# Patient Record
Sex: MALE | Race: WHITE | Employment: FULL TIME | URBAN - METROPOLITAN AREA
[De-identification: names, ages, dates, MRNs, and addresses within clinical notes are randomized per-mention and may not be internally consistent; named-entity substitution may affect disease eponyms.]

---

## 2021-02-13 ENCOUNTER — HOSPITAL ENCOUNTER (EMERGENCY)
Age: 46
Discharge: HOME | End: 2021-02-13
Attending: EMERGENCY MEDICINE
Payer: OTHER MISCELLANEOUS

## 2021-02-13 ENCOUNTER — APPOINTMENT (OUTPATIENT)
Dept: CT IMAGING | Age: 46
End: 2021-02-13
Payer: OTHER MISCELLANEOUS

## 2021-02-13 VITALS
OXYGEN SATURATION: 98 % | TEMPERATURE: 98.7 F | RESPIRATION RATE: 18 BRPM | DIASTOLIC BLOOD PRESSURE: 87 MMHG | WEIGHT: 170 LBS | HEIGHT: 67 IN | BODY MASS INDEX: 26.68 KG/M2 | SYSTOLIC BLOOD PRESSURE: 139 MMHG | HEART RATE: 79 BPM
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
## 2021-02-13 LAB
ABO/RH: NORMAL
ALBUMIN SERPL-MCNC: 4.8 G/DL (ref 3.5–5.2)
ALP BLD-CCNC: 62 U/L (ref 40–129)
ALT SERPL-CCNC: 59 U/L (ref 0–40)
ANION GAP SERPL CALCULATED.3IONS-SCNC: 10 MMOL/L (ref 7–16)
ANTIBODY SCREEN: NORMAL
AST SERPL-CCNC: 39 U/L (ref 0–39)
BASOPHILS ABSOLUTE: 0.07 E9/L (ref 0–0.2)
BASOPHILS RELATIVE PERCENT: 1.3 % (ref 0–2)
BILIRUB SERPL-MCNC: 0.6 MG/DL (ref 0–1.2)
BUN BLDV-MCNC: 13 MG/DL (ref 6–20)
CALCIUM SERPL-MCNC: 9.3 MG/DL (ref 8.6–10.2)
CHLORIDE BLD-SCNC: 98 MMOL/L (ref 98–107)
CO2: 25 MMOL/L (ref 22–29)
CREAT SERPL-MCNC: 0.8 MG/DL (ref 0.7–1.2)
EOSINOPHILS ABSOLUTE: 0.31 E9/L (ref 0.05–0.5)
EOSINOPHILS RELATIVE PERCENT: 5.8 % (ref 0–6)
GFR AFRICAN AMERICAN: >60
GFR NON-AFRICAN AMERICAN: >60 ML/MIN/1.73
GLUCOSE BLD-MCNC: 102 MG/DL (ref 74–99)
HCT VFR BLD CALC: 46.5 % (ref 37–54)
HEMOGLOBIN: 16.4 G/DL (ref 12.5–16.5)
IMMATURE GRANULOCYTES #: 0.01 E9/L
IMMATURE GRANULOCYTES %: 0.2 % (ref 0–5)
INR BLD: 1
LIPASE: 13 U/L (ref 13–60)
LYMPHOCYTES ABSOLUTE: 1.81 E9/L (ref 1.5–4)
LYMPHOCYTES RELATIVE PERCENT: 34 % (ref 20–42)
MCH RBC QN AUTO: 30 PG (ref 26–35)
MCHC RBC AUTO-ENTMCNC: 35.3 % (ref 32–34.5)
MCV RBC AUTO: 85.2 FL (ref 80–99.9)
MONOCYTES ABSOLUTE: 0.68 E9/L (ref 0.1–0.95)
MONOCYTES RELATIVE PERCENT: 12.8 % (ref 2–12)
NEUTROPHILS ABSOLUTE: 2.44 E9/L (ref 1.8–7.3)
NEUTROPHILS RELATIVE PERCENT: 45.9 % (ref 43–80)
PDW BLD-RTO: 11.9 FL (ref 11.5–15)
PLATELET # BLD: 276 E9/L (ref 130–450)
PMV BLD AUTO: 9.8 FL (ref 7–12)
POTASSIUM REFLEX MAGNESIUM: 4.2 MMOL/L (ref 3.5–5)
PROTHROMBIN TIME: 12 SEC (ref 9.3–12.4)
RBC # BLD: 5.46 E12/L (ref 3.8–5.8)
SODIUM BLD-SCNC: 133 MMOL/L (ref 132–146)
TOTAL PROTEIN: 8 G/DL (ref 6.4–8.3)
TROPONIN: <0.01 NG/ML (ref 0–0.03)
WBC # BLD: 5.3 E9/L (ref 4.5–11.5)

## 2021-02-13 PROCEDURE — 99291 CRITICAL CARE FIRST HOUR: CPT

## 2021-02-13 PROCEDURE — 83690 ASSAY OF LIPASE: CPT

## 2021-02-13 PROCEDURE — 93005 ELECTROCARDIOGRAM TRACING: CPT | Performed by: GENERAL PRACTICE

## 2021-02-13 PROCEDURE — 70450 CT HEAD/BRAIN W/O DYE: CPT

## 2021-02-13 PROCEDURE — 85610 PROTHROMBIN TIME: CPT

## 2021-02-13 PROCEDURE — 6830039000 HC L3 TRAUMA ALERT

## 2021-02-13 PROCEDURE — 86900 BLOOD TYPING SEROLOGIC ABO: CPT

## 2021-02-13 PROCEDURE — 80053 COMPREHEN METABOLIC PANEL: CPT

## 2021-02-13 PROCEDURE — 86901 BLOOD TYPING SEROLOGIC RH(D): CPT

## 2021-02-13 PROCEDURE — 99285 EMERGENCY DEPT VISIT HI MDM: CPT | Performed by: SURGERY

## 2021-02-13 PROCEDURE — 6360000004 HC RX CONTRAST MEDICATION: Performed by: RADIOLOGY

## 2021-02-13 PROCEDURE — 85025 COMPLETE CBC W/AUTO DIFF WBC: CPT

## 2021-02-13 PROCEDURE — 71260 CT THORAX DX C+: CPT

## 2021-02-13 PROCEDURE — 86850 RBC ANTIBODY SCREEN: CPT

## 2021-02-13 PROCEDURE — 74177 CT ABD & PELVIS W/CONTRAST: CPT

## 2021-02-13 PROCEDURE — 72125 CT NECK SPINE W/O DYE: CPT

## 2021-02-13 PROCEDURE — G0390 TRAUMA RESPONS W/HOSP CRITI: HCPCS

## 2021-02-13 PROCEDURE — 84484 ASSAY OF TROPONIN QUANT: CPT

## 2021-02-13 RX ORDER — NAPROXEN 375 MG/1
375 TABLET ORAL 2 TIMES DAILY WITH MEALS
Qty: 60 TABLET | Refills: 0 | Status: SHIPPED | OUTPATIENT
Start: 2021-02-13

## 2021-02-13 RX ADMIN — IOPAMIDOL 75 ML: 755 INJECTION, SOLUTION INTRAVENOUS at 17:39

## 2021-02-13 NOTE — ED NOTES
Pt states he doesn't drink or do drugs, he used to smoke but quit in 2012     Carin Mosley, [address] [facility] [place]  02/13/21 7793

## 2021-02-13 NOTE — ED NOTES
Pt log rolled to right side, C-Spine maintained in neutral position  Tenderness to lumbar region upon palpation no step offs no deformities      Chad Albrecht RN  02/13/21 2333

## 2021-02-13 NOTE — ED NOTES
C Collar applied to pt   Breath sounds clear b/l  Pt denies blood thinners  GCS 15  Pt has RLQ pain upon palpation  Equal strong fem pulses  Good pedal pulses  No pelvic pain     Marcia Ford RN  02/13/21 076 [facility]  02/13/21 9547
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
## 2021-02-13 NOTE — ED NOTES
Results of testing explained to patient. Patient verbalizes understanding of instructions regarding testing and need to follow up with PCP and/or specialist provider.         Meryl Sears RN  02/13/21 8179
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
## 2021-02-14 LAB
EKG ATRIAL RATE: 62 BPM
EKG P AXIS: 2 DEGREES
EKG P-R INTERVAL: 138 MS
EKG Q-T INTERVAL: 410 MS
EKG QRS DURATION: 94 MS
EKG QTC CALCULATION (BAZETT): 416 MS
EKG R AXIS: -14 DEGREES
EKG T AXIS: 15 DEGREES
EKG VENTRICULAR RATE: 62 BPM

## 2021-02-14 PROCEDURE — 93010 ELECTROCARDIOGRAM REPORT: CPT | Performed by: INTERNAL MEDICINE

## 2023-08-09 LAB
BASOPHILS (10*3/UL) IN BLOOD BY AUTOMATED COUNT: 0.05 X10E9/L (ref 0–0.1)
BASOPHILS/100 LEUKOCYTES IN BLOOD BY AUTOMATED COUNT: 0.9 % (ref 0–2)
EOSINOPHILS (10*3/UL) IN BLOOD BY AUTOMATED COUNT: 0.23 X10E9/L (ref 0–0.7)
EOSINOPHILS/100 LEUKOCYTES IN BLOOD BY AUTOMATED COUNT: 4.1 % (ref 0–6)
ERYTHROCYTE DISTRIBUTION WIDTH (RATIO) BY AUTOMATED COUNT: 12.5 % (ref 11.5–14.5)
ERYTHROCYTE MEAN CORPUSCULAR HEMOGLOBIN CONCENTRATION (G/DL) BY AUTOMATED: 33.1 G/DL (ref 32–36)
ERYTHROCYTE MEAN CORPUSCULAR VOLUME (FL) BY AUTOMATED COUNT: 92 FL (ref 80–100)
ERYTHROCYTES (10*6/UL) IN BLOOD BY AUTOMATED COUNT: 5.3 X10E12/L (ref 4.5–5.9)
HEMATOCRIT (%) IN BLOOD BY AUTOMATED COUNT: 48.7 % (ref 41–52)
HEMOGLOBIN (G/DL) IN BLOOD: 16.1 G/DL (ref 13.5–17.5)
IMMATURE GRANULOCYTES/100 LEUKOCYTES IN BLOOD BY AUTOMATED COUNT: 0 % (ref 0–0.9)
IRON (UG/DL) IN SER/PLAS: 73 UG/DL (ref 35–150)
IRON BINDING CAPACITY (UG/DL) IN SER/PLAS: 272 UG/DL (ref 240–445)
IRON SATURATION (%) IN SER/PLAS: 27 % (ref 25–45)
LEUKOCYTES (10*3/UL) IN BLOOD BY AUTOMATED COUNT: 5.6 X10E9/L (ref 4.4–11.3)
LYMPHOCYTES (10*3/UL) IN BLOOD BY AUTOMATED COUNT: 1.71 X10E9/L (ref 1.2–4.8)
LYMPHOCYTES/100 LEUKOCYTES IN BLOOD BY AUTOMATED COUNT: 30.4 % (ref 13–44)
MONOCYTES (10*3/UL) IN BLOOD BY AUTOMATED COUNT: 0.61 X10E9/L (ref 0.1–1)
MONOCYTES/100 LEUKOCYTES IN BLOOD BY AUTOMATED COUNT: 10.8 % (ref 2–10)
NEUTROPHILS (10*3/UL) IN BLOOD BY AUTOMATED COUNT: 3.03 X10E9/L (ref 1.2–7.7)
NEUTROPHILS/100 LEUKOCYTES IN BLOOD BY AUTOMATED COUNT: 53.8 % (ref 40–80)
PLATELETS (10*3/UL) IN BLOOD AUTOMATED COUNT: 243 X10E9/L (ref 150–450)

## 2023-08-10 LAB
A10: NORMAL
A117: NORMAL
A11B: NORMAL
A13: NORMAL
A14: NORMAL
A15: NORMAL
A163: NORMAL
A16: NORMAL
A177: NORMAL
A19: NORMAL
A1C: NORMAL
A20: NORMAL
A22: NORMAL
A2: NORMAL
A304: NORMAL
A33: NORMAL
A34: NORMAL
A38: NORMAL
A3: NORMAL
A45: NORMAL
A4: NORMAL
A56: NORMAL
A5: NORMAL
A71: NORMAL
A7: NORMAL
A8: NORMAL
A9: NORMAL
AANTI: NORMAL
ABCP1: NORMAL
AHLAD: NORMAL
AKAPP: NORMAL
ALAMB: NORMAL
BDESC: NORMAL
BPERC: NORMAL %
CCOLL: NORMAL
CCOUN: NORMAL X10E9/L
COBALAMIN (VITAMIN B12) (PG/ML) IN SER/PLAS: 672 PG/ML (ref 211–911)
CP1PH: NORMAL
FANTA: NORMAL
FSITE: NORMAL
GDESC: NORMAL
GPERC: NORMAL %
L10: NORMAL
L11C: NORMAL
L13: NORMAL
L14: NORMAL
L16: NORMAL
L180: NORMAL
L19: NORMAL
L1C: NORMAL
L200: NORMAL
L20: NORMAL
L23: NORMAL
L25: NORMAL
L2: NORMAL
L38: NORMAL
L3: NORMAL
L43: NORMAL
L45: NORMAL
L4: NORMAL
L56: NORMAL
L5: NORMAL
L71: NORMAL
L79B: NORMAL
L7: NORMAL
L8: NORMAL
LCD19: NORMAL % OF LYMPH
LCD4: NORMAL % OF LYMPH
LCD8: NORMAL % OF LYMPH
LDESC: NORMAL
LGPD1: NORMAL
LGPNO: NORMAL
LHLAD: NORMAL
LIGD: NORMAL
LKAPP: NORMAL
LLAMB: NORMAL
LNK: NORMAL % OF LYMPH
LPERC: NORMAL %
Lab: NORMAL
Lab: NORMAL %
MDESC: NORMAL
MPERC: NORMAL %
PDESC: NORMAL
PERC: NORMAL %
PPERC: NORMAL %
RDESC: NORMAL
RPERC: NORMAL %
VIAB: NORMAL

## 2023-08-14 LAB
CANTI: NORMAL
CCOLL: NORMAL PER TUBE
CCOUN: 5.6 X10E9/L
FCTOR: NORMAL
FCTSO: NORMAL
FSITE: NORMAL
GPERC: 54 %
LCD19: 11 % OF LYMPH
LCD4: 51 % OF LYMPH
LCD8: 23 % OF LYMPH
LDESC: NORMAL
LGPD1: NORMAL
LGPNO: NORMAL
LNK: 13 % OF LYMPH
LPERC: 28 %
MPERC: 8 %
PV194: NORMAL
VIAB: NORMAL

## 2023-10-06 ENCOUNTER — HOSPITAL ENCOUNTER (OUTPATIENT)
Dept: RADIOLOGY | Facility: HOSPITAL | Age: 48
Discharge: HOME | End: 2023-10-06
Payer: COMMERCIAL

## 2023-10-06 DIAGNOSIS — D83.9 CVID (COMMON VARIABLE IMMUNODEFICIENCY) (MULTI): ICD-10-CM

## 2023-10-06 PROCEDURE — 70486 CT MAXILLOFACIAL W/O DYE: CPT | Performed by: RADIOLOGY

## 2023-10-06 PROCEDURE — 70486 CT MAXILLOFACIAL W/O DYE: CPT

## 2024-06-13 DIAGNOSIS — D83.9 CVID (COMMON VARIABLE IMMUNODEFICIENCY) (MULTI): Primary | ICD-10-CM

## 2024-07-17 ENCOUNTER — LAB (OUTPATIENT)
Dept: LAB | Facility: LAB | Age: 49
End: 2024-07-17
Payer: COMMERCIAL

## 2024-07-17 DIAGNOSIS — D83.9 CVID (COMMON VARIABLE IMMUNODEFICIENCY) (MULTI): ICD-10-CM

## 2024-07-17 LAB
ALBUMIN SERPL BCP-MCNC: 4.2 G/DL (ref 3.4–5)
ALP SERPL-CCNC: 65 U/L (ref 33–120)
ALT SERPL W P-5'-P-CCNC: 37 U/L (ref 10–52)
ANION GAP SERPL CALC-SCNC: 7 MMOL/L (ref 10–20)
AST SERPL W P-5'-P-CCNC: 28 U/L (ref 9–39)
BASOPHILS # BLD AUTO: 0.04 X10*3/UL (ref 0–0.1)
BASOPHILS NFR BLD AUTO: 1 %
BILIRUB SERPL-MCNC: 0.3 MG/DL (ref 0–1.2)
BUN SERPL-MCNC: 16 MG/DL (ref 6–23)
CALCIUM SERPL-MCNC: 9.3 MG/DL (ref 8.6–10.3)
CHLORIDE SERPL-SCNC: 104 MMOL/L (ref 98–107)
CO2 SERPL-SCNC: 30 MMOL/L (ref 21–32)
CREAT SERPL-MCNC: 0.82 MG/DL (ref 0.5–1.3)
EGFRCR SERPLBLD CKD-EPI 2021: >90 ML/MIN/1.73M*2
EOSINOPHIL # BLD AUTO: 0.17 X10*3/UL (ref 0–0.7)
EOSINOPHIL NFR BLD AUTO: 4.2 %
ERYTHROCYTE [DISTWIDTH] IN BLOOD BY AUTOMATED COUNT: 12.1 % (ref 11.5–14.5)
ERYTHROCYTE [SEDIMENTATION RATE] IN BLOOD BY WESTERGREN METHOD: 1 MM/H (ref 0–15)
GLUCOSE SERPL-MCNC: 88 MG/DL (ref 74–99)
HCT VFR BLD AUTO: 43.5 % (ref 41–52)
HGB BLD-MCNC: 15 G/DL (ref 13.5–17.5)
IGA SERPL-MCNC: 215 MG/DL (ref 70–400)
IGG SERPL-MCNC: 1790 MG/DL (ref 700–1600)
IGM SERPL-MCNC: 66 MG/DL (ref 40–230)
IMM GRANULOCYTES # BLD AUTO: 0 X10*3/UL (ref 0–0.7)
IMM GRANULOCYTES NFR BLD AUTO: 0 % (ref 0–0.9)
LYMPHOCYTES # BLD AUTO: 1.37 X10*3/UL (ref 1.2–4.8)
LYMPHOCYTES NFR BLD AUTO: 33.7 %
MCH RBC QN AUTO: 30.5 PG (ref 26–34)
MCHC RBC AUTO-ENTMCNC: 34.5 G/DL (ref 32–36)
MCV RBC AUTO: 89 FL (ref 80–100)
MONOCYTES # BLD AUTO: 0.56 X10*3/UL (ref 0.1–1)
MONOCYTES NFR BLD AUTO: 13.8 %
NEUTROPHILS # BLD AUTO: 1.93 X10*3/UL (ref 1.2–7.7)
NEUTROPHILS NFR BLD AUTO: 47.3 %
NRBC BLD-RTO: 0 /100 WBCS (ref 0–0)
PLATELET # BLD AUTO: 203 X10*3/UL (ref 150–450)
POTASSIUM SERPL-SCNC: 4.1 MMOL/L (ref 3.5–5.3)
PROT SERPL-MCNC: 7.6 G/DL (ref 6.4–8.2)
RBC # BLD AUTO: 4.91 X10*6/UL (ref 4.5–5.9)
SODIUM SERPL-SCNC: 137 MMOL/L (ref 136–145)
WBC # BLD AUTO: 4.1 X10*3/UL (ref 4.4–11.3)

## 2024-07-17 PROCEDURE — 36415 COLL VENOUS BLD VENIPUNCTURE: CPT

## 2024-07-17 PROCEDURE — 82784 ASSAY IGA/IGD/IGG/IGM EACH: CPT

## 2024-12-27 DIAGNOSIS — B99.9 RECURRENT INFECTIONS: Primary | ICD-10-CM

## 2025-01-28 LAB
ALBUMIN SERPL-MCNC: 4.6 G/DL (ref 3.6–5.1)
ALP SERPL-CCNC: 62 U/L (ref 36–130)
ALT SERPL-CCNC: 46 U/L (ref 9–46)
ANION GAP SERPL CALCULATED.4IONS-SCNC: 8 MMOL/L (CALC) (ref 7–17)
AST SERPL-CCNC: 31 U/L (ref 10–40)
BASOPHILS # BLD AUTO: 38 CELLS/UL (ref 0–200)
BASOPHILS NFR BLD AUTO: 0.8 %
BILIRUB SERPL-MCNC: 0.5 MG/DL (ref 0.2–1.2)
BUN SERPL-MCNC: 13 MG/DL (ref 7–25)
CALCIUM SERPL-MCNC: 9.3 MG/DL (ref 8.6–10.3)
CHLORIDE SERPL-SCNC: 102 MMOL/L (ref 98–110)
CO2 SERPL-SCNC: 27 MMOL/L (ref 20–32)
CREAT SERPL-MCNC: 0.73 MG/DL (ref 0.6–1.29)
CRP SERPL-MCNC: <3 MG/L
EGFRCR SERPLBLD CKD-EPI 2021: 113 ML/MIN/1.73M2
EOSINOPHIL # BLD AUTO: 101 CELLS/UL (ref 15–500)
EOSINOPHIL NFR BLD AUTO: 2.1 %
ERYTHROCYTE [DISTWIDTH] IN BLOOD BY AUTOMATED COUNT: 12.9 % (ref 11–15)
ERYTHROCYTE [SEDIMENTATION RATE] IN BLOOD BY WESTERGREN METHOD: 2 MM/H
GLUCOSE SERPL-MCNC: 84 MG/DL (ref 65–139)
HCT VFR BLD AUTO: 50.3 % (ref 38.5–50)
HGB BLD-MCNC: 16.9 G/DL (ref 13.2–17.1)
IGA SERPL-MCNC: 252 MG/DL (ref 47–310)
IGG SERPL-MCNC: 1882 MG/DL (ref 600–1640)
IGM SERPL-MCNC: 89 MG/DL (ref 50–300)
LYMPHOCYTES # BLD AUTO: 1805 CELLS/UL (ref 850–3900)
LYMPHOCYTES NFR BLD AUTO: 37.6 %
MCH RBC QN AUTO: 30.7 PG (ref 27–33)
MCHC RBC AUTO-ENTMCNC: 33.6 G/DL (ref 32–36)
MCV RBC AUTO: 91.5 FL (ref 80–100)
MONOCYTES # BLD AUTO: 509 CELLS/UL (ref 200–950)
MONOCYTES NFR BLD AUTO: 10.6 %
NEUTROPHILS # BLD AUTO: 2347 CELLS/UL (ref 1500–7800)
NEUTROPHILS NFR BLD AUTO: 48.9 %
PLATELET # BLD AUTO: 234 THOUSAND/UL (ref 140–400)
PMV BLD REES-ECKER: 10.3 FL (ref 7.5–12.5)
POTASSIUM SERPL-SCNC: 4.3 MMOL/L (ref 3.5–5.3)
PROT SERPL-MCNC: 7.5 G/DL (ref 6.1–8.1)
RBC # BLD AUTO: 5.5 MILLION/UL (ref 4.2–5.8)
SODIUM SERPL-SCNC: 137 MMOL/L (ref 135–146)
WBC # BLD AUTO: 4.8 THOUSAND/UL (ref 3.8–10.8)